# Patient Record
Sex: FEMALE | Race: WHITE | URBAN - METROPOLITAN AREA
[De-identification: names, ages, dates, MRNs, and addresses within clinical notes are randomized per-mention and may not be internally consistent; named-entity substitution may affect disease eponyms.]

---

## 2020-06-08 ENCOUNTER — OFFICE VISIT (OUTPATIENT)
Dept: ENT CLINIC | Age: 25
End: 2020-06-08
Payer: COMMERCIAL

## 2020-06-08 ENCOUNTER — PROCEDURE VISIT (OUTPATIENT)
Dept: AUDIOLOGY | Age: 25
End: 2020-06-08
Payer: COMMERCIAL

## 2020-06-08 VITALS
BODY MASS INDEX: 31.76 KG/M2 | HEART RATE: 75 BPM | SYSTOLIC BLOOD PRESSURE: 114 MMHG | HEIGHT: 64 IN | WEIGHT: 186 LBS | DIASTOLIC BLOOD PRESSURE: 76 MMHG

## 2020-06-08 PROCEDURE — 92557 COMPREHENSIVE HEARING TEST: CPT | Performed by: AUDIOLOGIST

## 2020-06-08 PROCEDURE — 99204 OFFICE O/P NEW MOD 45 MIN: CPT | Performed by: NURSE PRACTITIONER

## 2020-06-08 PROCEDURE — 69210 REMOVE IMPACTED EAR WAX UNI: CPT | Performed by: NURSE PRACTITIONER

## 2020-06-08 PROCEDURE — 92567 TYMPANOMETRY: CPT | Performed by: AUDIOLOGIST

## 2020-06-08 RX ORDER — MONTELUKAST SODIUM 10 MG/1
10 TABLET ORAL NIGHTLY
COMMUNITY

## 2020-06-08 RX ORDER — ALBUTEROL SULFATE 90 UG/1
AEROSOL, METERED RESPIRATORY (INHALATION)
COMMUNITY

## 2020-06-08 RX ORDER — ALBUTEROL SULFATE 2.5 MG/3ML
1 SOLUTION RESPIRATORY (INHALATION)
COMMUNITY
Start: 2019-09-17

## 2020-06-08 ASSESSMENT — ENCOUNTER SYMPTOMS
SHORTNESS OF BREATH: 0
GASTROINTESTINAL NEGATIVE: 1
ABDOMINAL PAIN: 0
EYES NEGATIVE: 1
STRIDOR: 0
RHINORRHEA: 0
RESPIRATORY NEGATIVE: 1

## 2020-06-08 NOTE — PROGRESS NOTES
[id number] [facility] Otolaryngology  Dr. Travis Carlisle. MAUREEN Sarabia Ms.Ed. New Consult       Patient Name:  Prasanna Wiggins  :  1995     CHIEF C/O:    Chief Complaint   Patient presents with    Hearing Problem     right ear hearing loss - almost 4 years       HISTORY OBTAINED FROM:  patient    HISTORY OF PRESENT ILLNESS:       Lisa Epstein is a 22y.o. year old female, here today for loss of hearing in the left ear. She states that the issue has been present for 4 year. She has not previously seen for this. She states she has fullness in the right ear with pain and vibration due to loud noises. She has no history of previous ear surgeries. She denies chronic ear infections recently or as a child. Denies any family history of significant hearing loss. She denies recent drainage or fevers. She does complain of intermittent vertigo. She states that she does get intermittent tinnitus in the left ear that is a single high pitch that is non-pulsatile. She denies significant noise exposure. No past medical history on file. No past surgical history on file. Current Outpatient Medications:     albuterol sulfate  (90 Base) MCG/ACT inhaler, Inhale into the lungs, Disp: , Rfl:     montelukast (SINGULAIR) 10 MG tablet, Take 10 mg by mouth nightly, Disp: , Rfl:     fluticasone-salmeterol (ADVAIR DISKUS) 100-50 MCG/DOSE diskus inhaler, Inhale 1 puff into the lungs every 12 hours, Disp: , Rfl:     albuterol (PROVENTIL) (2.5 MG/3ML) 0.083% nebulizer solution, Inhale 1 mL into the lungs every 4-6 hours as needed, Disp: , Rfl:   Nickel  Social History     Tobacco Use    Smoking status: Not on file   Substance Use Topics    Alcohol use: Not on file    Drug use: Not on file     No family history on file. Review of Systems   Constitutional: Negative. Negative for activity change and appetite change. HENT: Positive for ear pain, hearing loss, postnasal drip and tinnitus.  Negative for congestion, ear discharge and rhinorrhea. Eyes: Negative. Respiratory: Negative. Negative for shortness of breath and stridor. Cardiovascular: Negative. Negative for chest pain and palpitations. Gastrointestinal: Negative. Negative for abdominal pain. Endocrine: Negative. Genitourinary: Negative. Musculoskeletal: Negative. Skin: Negative. Neurological: Positive for dizziness. Hematological: Negative. Psychiatric/Behavioral: Negative. /76 (Site: Right Upper Arm, Position: Sitting, Cuff Size: Medium Adult)   Pulse 75   Ht 5' 4\" (1.626 m)   Wt 186 lb (84.4 kg)   LMP 03/13/2020   BMI 31.93 kg/m²   Physical Exam  Constitutional:       Appearance: Normal appearance. HENT:      Head: Normocephalic. Right Ear: Tympanic membrane, ear canal and external ear normal. There is impacted cerumen. Left Ear: Tympanic membrane, ear canal and external ear normal. There is impacted cerumen. Nose: Nose normal. No congestion or rhinorrhea. Right Nostril: No occlusion. Left Nostril: No occlusion. Right Turbinates: Not enlarged. Left Turbinates: Not enlarged. Mouth/Throat:      Lips: Pink. Mouth: Mucous membranes are moist.      Tongue: No lesions. Palate: No mass. Pharynx: Oropharynx is clear. Eyes:      Conjunctiva/sclera: Conjunctivae normal.      Pupils: Pupils are equal, round, and reactive to light. Neck:      Musculoskeletal: Normal range of motion. No neck rigidity or muscular tenderness. Cardiovascular:      Rate and Rhythm: Normal rate and regular rhythm. Pulses: Normal pulses. Pulmonary:      Effort: Pulmonary effort is normal. No respiratory distress. Breath sounds: Normal breath sounds. No stridor. Abdominal:      Palpations: Abdomen is soft. Tenderness: There is no abdominal tenderness. Musculoskeletal: Normal range of motion. Skin:     General: Skin is warm and dry.    Neurological:      General: No focal deficit present. Mental Status: She is alert and oriented to person, place, and time. Psychiatric:         Mood and Affect: Mood normal.         Behavior: Behavior normal.         Thought Content: Thought content normal.         Judgment: Judgment normal.       Audiogram and tympanogram reviewed with patient. She is found to have mixed hearing loss in the right ear of 20 dB, with 100% discrimination at 60 dB, with 5 dB sensorineural hearing loss in the left ear, 100% discrimination at 45 dB. Tympanogram revealed bilateral type A curves. IMPRESSION/PLAN:  Cerumen removal     Auditory canal(s) both ears completely obstructed with cerumen. Cerumen was gently removed using soft plastic curette. Tympanic membranes are intact following the procedure. Auditory canals appear normal.  Patient tolerated well. Ryley Espinal was seen today for hearing problem. Diagnoses and all orders for this visit:    Mixed conductive and sensorineural hearing loss of left ear with unrestricted hearing of right ear  -     MRI IAC Posterior Fossa W WO Contrast; Future    Bilateral impacted cerumen  -     NE REMOVAL IMPACTED CERUMEN INSTRUMENTATION Cornelius Alvarez is seen today for right sided hearing loss and fullness. On exam she is found to have a complete cerumen impaction of the right ear with partial impaction of the left. These were removed without difficulty. An audiogram and tympanogram were obtained with asymmetric mixed hearing loss of the right ear. An MRI of the IAC is ordered to assess for vestibular schwannoma. She is to return in 1 month for repeat audiogram and to review MRI results. She is instructed to call for any new or worsening of her symptoms.       Ashu Morfin, MSN, FNP-C  8 [facility], Nose and Throat

## 2020-07-01 ENCOUNTER — TELEPHONE (OUTPATIENT)
Dept: ENT CLINIC | Age: 25
End: 2020-07-01

## 2020-07-01 NOTE — TELEPHONE ENCOUNTER
Patient called in wanting to know when her appointment was and if her MRI results were sent over. I gave her our fax number and advised her to call to have them send them again.

## 2020-07-13 ENCOUNTER — OFFICE VISIT (OUTPATIENT)
Dept: ENT CLINIC | Age: 25
End: 2020-07-13
Payer: COMMERCIAL

## 2020-07-13 VITALS — HEIGHT: 64 IN | WEIGHT: 187 LBS | BODY MASS INDEX: 31.92 KG/M2

## 2020-07-13 PROCEDURE — 99213 OFFICE O/P EST LOW 20 MIN: CPT | Performed by: NURSE PRACTITIONER

## 2020-07-13 RX ORDER — ESCITALOPRAM OXALATE 10 MG/1
TABLET ORAL
COMMUNITY
Start: 2020-07-01

## 2020-07-13 RX ORDER — HYDROXYZINE HYDROCHLORIDE 25 MG/1
TABLET, FILM COATED ORAL
COMMUNITY
Start: 2020-07-01

## 2020-07-13 SDOH — HEALTH STABILITY: MENTAL HEALTH: HOW OFTEN DO YOU HAVE A DRINK CONTAINING ALCOHOL?: MONTHLY OR LESS

## 2020-07-13 SDOH — HEALTH STABILITY: MENTAL HEALTH: HOW MANY STANDARD DRINKS CONTAINING ALCOHOL DO YOU HAVE ON A TYPICAL DAY?: 1 OR 2

## 2020-07-13 ASSESSMENT — ENCOUNTER SYMPTOMS
EYES NEGATIVE: 1
STRIDOR: 0
ABDOMINAL PAIN: 0
RHINORRHEA: 0
SHORTNESS OF BREATH: 0
GASTROINTESTINAL NEGATIVE: 1
RESPIRATORY NEGATIVE: 1

## 2020-07-13 NOTE — PROGRESS NOTES
[id number] [facility] Otolaryngology  Dr. Ivory Temple. Rachel Trinh. Ms.Ed        Patient Name:  Lefty Holguin  :  1995     CHIEF C/O:    Chief Complaint   Patient presents with    Results     pt here for MRI results. HISTORY OBTAINED FROM:  patient    HISTORY OF PRESENT ILLNESS:       Dolly Holman is a 22y.o. year old female, here today for follow up of right ear hearing loss and MRI results. She states that she has noticed no changes in her hearing since her last appointment. She denies any pain, drainage or fullness sensation in the right ear. She denies any new congestion, rhinorrhea or post nasal drainage. She denies any new tinnitus or vertigo. She states that the hearing loss in her right ear is beginning to affect her daily function and often has to have people repeat themselves in conversations, especially when other noises are present. No past medical history on file. No past surgical history on file. Current Outpatient Medications:     escitalopram (LEXAPRO) 10 MG tablet, take 1 tablet by mouth once daily, Disp: , Rfl:     hydrOXYzine (ATARAX) 25 MG tablet, take 1 tablet by mouth twice a day, Disp: , Rfl:     albuterol sulfate  (90 Base) MCG/ACT inhaler, Inhale into the lungs, Disp: , Rfl:     montelukast (SINGULAIR) 10 MG tablet, Take 10 mg by mouth nightly, Disp: , Rfl:     fluticasone-salmeterol (ADVAIR DISKUS) 100-50 MCG/DOSE diskus inhaler, Inhale 1 puff into the lungs every 12 hours, Disp: , Rfl:     albuterol (PROVENTIL) (2.5 MG/3ML) 0.083% nebulizer solution, Inhale 1 mL into the lungs every 4-6 hours as needed, Disp: , Rfl:   Nickel  Social History     Tobacco Use    Smoking status: Current Some Day Smoker     Types: Cigarettes    Smokeless tobacco: Never Used   Substance Use Topics    Alcohol use: Yes     Frequency: Monthly or less     Drinks per session: 1 or 2    Drug use: Not Currently     No family history on file. Review of Systems   Constitutional: Negative. Negative for activity change and appetite change. HENT: Positive for hearing loss, postnasal drip and tinnitus. Negative for congestion, ear discharge, ear pain and rhinorrhea. Eyes: Negative. Respiratory: Negative. Negative for shortness of breath and stridor. Cardiovascular: Negative. Negative for chest pain and palpitations. Gastrointestinal: Negative. Negative for abdominal pain. Endocrine: Negative. Genitourinary: Negative. Musculoskeletal: Negative. Skin: Negative. Neurological: Negative for dizziness. Hematological: Negative. Psychiatric/Behavioral: Negative. Ht 5' 4\" (1.626 m)   Wt 187 lb (84.8 kg)   LMP 07/13/2020 (Exact Date)   BMI 32.10 kg/m²   Physical Exam  Constitutional:       Appearance: Normal appearance. HENT:      Head: Normocephalic. Right Ear: Tympanic membrane, ear canal and external ear normal. There is no impacted cerumen. Left Ear: Tympanic membrane, ear canal and external ear normal. There is no impacted cerumen. Nose: Nose normal. No congestion or rhinorrhea. Right Nostril: No occlusion. Left Nostril: No occlusion. Right Turbinates: Not enlarged. Left Turbinates: Not enlarged. Mouth/Throat:      Lips: Pink. Mouth: Mucous membranes are moist.      Tongue: No lesions. Palate: No mass. Pharynx: Oropharynx is clear. Eyes:      Conjunctiva/sclera: Conjunctivae normal.      Pupils: Pupils are equal, round, and reactive to light. Neck:      Musculoskeletal: Normal range of motion. No neck rigidity or muscular tenderness. Cardiovascular:      Rate and Rhythm: Normal rate and regular rhythm. Pulses: Normal pulses. Pulmonary:      Effort: Pulmonary effort is normal. No respiratory distress. Breath sounds: Normal breath sounds. No stridor. Abdominal:      Palpations: Abdomen is soft. Tenderness: There is no abdominal tenderness. Musculoskeletal: Normal range of motion. Skin:     General: Skin is warm and dry. Neurological:      General: No focal deficit present. Mental Status: She is alert and oriented to person, place, and time. Psychiatric:         Mood and Affect: Mood normal.         Behavior: Behavior normal.         Thought Content: Thought content normal.         Judgment: Judgment normal.         IMPRESSION/PLAN:      Mayte Harrison was seen today for results. Diagnoses and all orders for this visit:    Mixed conductive and sensorineural hearing loss of right ear with unrestricted hearing of left ear    Tinnitus, right ear      Mayte Harrison is seen today for follow-up of an MRI and right hearing loss. MRI of the IAC reveals no significant findings. Patient states she has had no change in her hearing since her last appointment. Upon reviewing her previous audiogram, there is a mixed sensorineural and conductive hearing loss to the right ear of 20 dB, greater in the low frequencies. Due to the nature of her hearing loss and chronic severity over the past 4 years she is a good candidate for hearing aid in the right ear and is cleared from an ENT standpoint for this. .  She will be directed towards audiology to start this process. She is to follow-up in 6 months with a repeat audio. She is instructed to call with any new or worsening symptoms prior to her next appointment.       Kelsie Ramirez, MSN, FNP-C  8 [facility], Nose and Throat

## 2024-03-25 ENCOUNTER — APPOINTMENT (OUTPATIENT)
Dept: OBSTETRICS AND GYNECOLOGY | Facility: CLINIC | Age: 29
End: 2024-03-25
Payer: COMMERCIAL

## 2024-04-27 ENCOUNTER — HOSPITAL ENCOUNTER (EMERGENCY)
Facility: HOSPITAL | Age: 29
Discharge: HOME | End: 2024-04-27
Payer: COMMERCIAL

## 2024-04-27 ENCOUNTER — APPOINTMENT (OUTPATIENT)
Dept: RADIOLOGY | Facility: HOSPITAL | Age: 29
End: 2024-04-27
Payer: COMMERCIAL

## 2024-04-27 VITALS
HEART RATE: 67 BPM | SYSTOLIC BLOOD PRESSURE: 130 MMHG | DIASTOLIC BLOOD PRESSURE: 82 MMHG | WEIGHT: 140 LBS | BODY MASS INDEX: 23.9 KG/M2 | HEIGHT: 64 IN | OXYGEN SATURATION: 100 % | TEMPERATURE: 98.1 F | RESPIRATION RATE: 16 BRPM

## 2024-04-27 DIAGNOSIS — S69.91XA INJURY OF RIGHT WRIST, INITIAL ENCOUNTER: Primary | ICD-10-CM

## 2024-04-27 PROCEDURE — 73130 X-RAY EXAM OF HAND: CPT | Mod: RIGHT SIDE | Performed by: RADIOLOGY

## 2024-04-27 PROCEDURE — 73130 X-RAY EXAM OF HAND: CPT | Mod: RT

## 2024-04-27 PROCEDURE — 73110 X-RAY EXAM OF WRIST: CPT | Mod: RIGHT SIDE | Performed by: RADIOLOGY

## 2024-04-27 PROCEDURE — 73110 X-RAY EXAM OF WRIST: CPT | Mod: RT

## 2024-04-27 PROCEDURE — 99284 EMERGENCY DEPT VISIT MOD MDM: CPT | Performed by: NURSE PRACTITIONER

## 2024-04-27 ASSESSMENT — COLUMBIA-SUICIDE SEVERITY RATING SCALE - C-SSRS
6. HAVE YOU EVER DONE ANYTHING, STARTED TO DO ANYTHING, OR PREPARED TO DO ANYTHING TO END YOUR LIFE?: NO
1. IN THE PAST MONTH, HAVE YOU WISHED YOU WERE DEAD OR WISHED YOU COULD GO TO SLEEP AND NOT WAKE UP?: NO
2. HAVE YOU ACTUALLY HAD ANY THOUGHTS OF KILLING YOURSELF?: NO

## 2024-04-27 ASSESSMENT — LIFESTYLE VARIABLES
HAVE YOU EVER FELT YOU SHOULD CUT DOWN ON YOUR DRINKING: NO
EVER HAD A DRINK FIRST THING IN THE MORNING TO STEADY YOUR NERVES TO GET RID OF A HANGOVER: NO
TOTAL SCORE: 0
EVER FELT BAD OR GUILTY ABOUT YOUR DRINKING: NO
HAVE PEOPLE ANNOYED YOU BY CRITICIZING YOUR DRINKING: NO

## 2024-04-27 ASSESSMENT — PAIN DESCRIPTION - ORIENTATION: ORIENTATION: RIGHT

## 2024-04-27 ASSESSMENT — PAIN SCALES - GENERAL
PAINLEVEL_OUTOF10: 9
PAINLEVEL_OUTOF10: 4

## 2024-04-27 ASSESSMENT — PAIN - FUNCTIONAL ASSESSMENT: PAIN_FUNCTIONAL_ASSESSMENT: 0-10

## 2024-04-27 ASSESSMENT — PAIN DESCRIPTION - PROGRESSION: CLINICAL_PROGRESSION: GRADUALLY WORSENING

## 2024-04-27 ASSESSMENT — PAIN DESCRIPTION - PAIN TYPE: TYPE: ACUTE PAIN

## 2024-04-27 ASSESSMENT — PAIN DESCRIPTION - LOCATION: LOCATION: HAND

## 2024-04-27 NOTE — ED PROVIDER NOTES
Chief Complaint   Patient presents with    Hand Injury     Right hand injury, from rollerskating and let it go for about 2 months and has not improved.        HPI       28 year old right hand dominant female presents to the Emergency Department today complaining of right hand and wrist pain status post fall that occurred 1-2 weeks ago. Notes that she slipped while roller skating and fell landing on her right hand. Denies hitting their head, loss of consciousness, or seizure-like activity. Denies any other injuries.       History provided by:  Patient             Patient History   Past Medical History:   Diagnosis Date    38 weeks gestation of pregnancy ([facility]) 03/10/2020    38 weeks gestation of pregnancy    Anxiety disorder, unspecified     Anxiety and depression    Chronic rhinitis     Rhinitis    Encounter for supervision of other normal pregnancy, unspecified trimester ([facility]) 03/10/2020    Normal pregnancy in multigravida     small for gestational age, unspecified weight ([facility]) 2020    SGA (small for gestational age)    Other conditions influencing health status 10/24/2019    History of pregnancy    Other specified pregnancy related conditions, unspecified trimester ([facility]) 2019    Rh negative, antepartum    Personal history of other diseases of the respiratory system     History of bronchitis    Personal history of other diseases of the respiratory system     History of asthma    Personal history of pneumonia (recurrent)     History of pneumonia    Personal history of urinary (tract) infections     History of urinary tract infection     Past Surgical History:   Procedure Laterality Date    OTHER SURGICAL HISTORY  2020    Tonsillectomy    OTHER SURGICAL HISTORY  2020    Bunionectomy    OTHER SURGICAL HISTORY  2020    Elbow surgery     No family history on file.  Social History     Tobacco Use    Smoking status: Not on file    Smokeless tobacco: Not on file    Substance Use Topics    Alcohol use: Not on file    Drug use: Not on file           Physical Exam  Constitutional:       General: She is awake.      Appearance: Normal appearance.   Cardiovascular:      Rate and Rhythm: Normal rate and regular rhythm.      Pulses:           Radial pulses are 3+ on the right side and 3+ on the left side.      Heart sounds: Normal heart sounds. No murmur heard.     No friction rub. No gallop.   Pulmonary:      Effort: Pulmonary effort is normal.      Breath sounds: Normal breath sounds and air entry.   Musculoskeletal:        Arms:       Comments: No obvious deformity or ecchymosis noted to the right hand/wrist, but there is edema and tenderness present over the right first metacarpal and navicular area. No other bony tenderness is appreciated. Right radial pulse is strong and regular. Capillary refill was within normal limits. Sensation is intact distally.    Neurological:      Mental Status: She is alert.   Psychiatric:         Behavior: Behavior is cooperative.         Labs Reviewed - No data to display    XR wrist right 3+ views   Final Result   No abnormalities.             MACRO:   None        Signed by: Heriberto Garnica 4/27/2024 12:14 PM   Dictation workstation:   [id number]      XR hand right 3+ views   Final Result   No abnormalities.             MACRO:   None        Signed by: Heriberto Garnica 4/27/2024 12:15 PM   Dictation workstation:   [id number]               ED Course & MDM   Diagnoses as of 04/27/24 1249   Injury of right wrist, initial encounter           Medical Decision Making  Patient was seen and evaluated by myself. Right hand and wrist x-rays show no acute pathology.  Take Tylenol and Advil over the counter as needed for fever and/or pain. No contraindications to NSAIDs are noted. Due to the navicular tenderness, I will have the staff place her in a Velcro thumb spica splint to wear for comfort. Given Dr. Hernandez, orthopedic doctor on call for follow up.  Return if worse in any way. Discharged in stable condition with computer instructions.     Diagnostic Impression:    1. Acute right wrist pain           Your medication list      You have not been prescribed any medications.           Procedure  Procedures     Marcell Franco, BALTA-RADHA  04/27/24 0986

## 2024-05-10 ENCOUNTER — APPOINTMENT (OUTPATIENT)
Dept: ORTHOPEDIC SURGERY | Facility: CLINIC | Age: 29
End: 2024-05-10
Payer: COMMERCIAL

## 2024-05-28 ENCOUNTER — APPOINTMENT (OUTPATIENT)
Dept: RADIOLOGY | Facility: HOSPITAL | Age: 29
End: 2024-05-28
Payer: COMMERCIAL

## 2024-05-28 ENCOUNTER — HOSPITAL ENCOUNTER (EMERGENCY)
Facility: HOSPITAL | Age: 29
Discharge: HOME | End: 2024-05-28
Payer: COMMERCIAL

## 2024-05-28 ENCOUNTER — PHARMACY VISIT (OUTPATIENT)
Dept: PHARMACY | Facility: CLINIC | Age: 29
End: 2024-05-28
Payer: MEDICAID

## 2024-05-28 VITALS
HEIGHT: 65 IN | WEIGHT: 140 LBS | BODY MASS INDEX: 23.32 KG/M2 | HEART RATE: 75 BPM | RESPIRATION RATE: 15 BRPM | OXYGEN SATURATION: 99 % | TEMPERATURE: 98.5 F | DIASTOLIC BLOOD PRESSURE: 64 MMHG | SYSTOLIC BLOOD PRESSURE: 103 MMHG

## 2024-05-28 DIAGNOSIS — S30.0XXA COCCYX CONTUSION, INITIAL ENCOUNTER: Primary | ICD-10-CM

## 2024-05-28 DIAGNOSIS — S30.0XXA PELVIC CONTUSION, INITIAL ENCOUNTER: ICD-10-CM

## 2024-05-28 DIAGNOSIS — S30.0XXA LUMBAR CONTUSION, INITIAL ENCOUNTER: ICD-10-CM
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
## 2024-05-28 PROCEDURE — 99284 EMERGENCY DEPT VISIT MOD MDM: CPT

## 2024-05-28 PROCEDURE — 72170 X-RAY EXAM OF PELVIS: CPT | Mod: FOREIGN READ | Performed by: RADIOLOGY

## 2024-05-28 PROCEDURE — 72170 X-RAY EXAM OF PELVIS: CPT

## 2024-05-28 PROCEDURE — RXMED WILLOW AMBULATORY MEDICATION CHARGE

## 2024-05-28 PROCEDURE — 2500000001 HC RX 250 WO HCPCS SELF ADMINISTERED DRUGS (ALT 637 FOR MEDICARE OP): Performed by: NURSE PRACTITIONER

## 2024-05-28 PROCEDURE — 72100 X-RAY EXAM L-S SPINE 2/3 VWS: CPT

## 2024-05-28 PROCEDURE — 72100 X-RAY EXAM L-S SPINE 2/3 VWS: CPT | Mod: FOREIGN READ | Performed by: RADIOLOGY

## 2024-05-28 RX ORDER — CYCLOBENZAPRINE HCL 10 MG
10 TABLET ORAL ONCE
Status: COMPLETED | OUTPATIENT
Start: 2024-05-28 | End: 2024-05-28

## 2024-05-28 RX ORDER — CYCLOBENZAPRINE HCL 10 MG
10 TABLET ORAL 3 TIMES DAILY PRN
Qty: 20 TABLET | Refills: 0 | Status: SHIPPED | OUTPATIENT
Start: 2024-05-28 | End: 2024-06-04

## 2024-05-28 RX ORDER — NAPROXEN 500 MG/1
500 TABLET ORAL
Qty: 14 TABLET | Refills: 0 | Status: SHIPPED | OUTPATIENT
Start: 2024-05-28 | End: 2024-06-04

## 2024-05-28 RX ORDER — NAPROXEN 250 MG/1
500 TABLET ORAL ONCE
Status: COMPLETED | OUTPATIENT
Start: 2024-05-28 | End: 2024-05-28

## 2024-05-28 RX ADMIN — NAPROXEN 500 MG: 250 TABLET ORAL at 10:47

## 2024-05-28 RX ADMIN — CYCLOBENZAPRINE HYDROCHLORIDE 10 MG: 10 TABLET, FILM COATED ORAL at 10:47

## 2024-05-28 ASSESSMENT — COLUMBIA-SUICIDE SEVERITY RATING SCALE - C-SSRS
1. IN THE PAST MONTH, HAVE YOU WISHED YOU WERE DEAD OR WISHED YOU COULD GO TO SLEEP AND NOT WAKE UP?: NO
6. HAVE YOU EVER DONE ANYTHING, STARTED TO DO ANYTHING, OR PREPARED TO DO ANYTHING TO END YOUR LIFE?: NO
2. HAVE YOU ACTUALLY HAD ANY THOUGHTS OF KILLING YOURSELF?: NO

## 2024-05-28 ASSESSMENT — LIFESTYLE VARIABLES
HAVE YOU EVER FELT YOU SHOULD CUT DOWN ON YOUR DRINKING: NO
EVER FELT BAD OR GUILTY ABOUT YOUR DRINKING: NO
EVER HAD A DRINK FIRST THING IN THE MORNING TO STEADY YOUR NERVES TO GET RID OF A HANGOVER: NO
TOTAL SCORE: 0
HAVE PEOPLE ANNOYED YOU BY CRITICIZING YOUR DRINKING: NO

## 2024-05-28 ASSESSMENT — PAIN - FUNCTIONAL ASSESSMENT
PAIN_FUNCTIONAL_ASSESSMENT: 0-10
PAIN_FUNCTIONAL_ASSESSMENT: 0-10

## 2024-05-28 ASSESSMENT — PAIN SCALES - GENERAL
PAINLEVEL_OUTOF10: 6
PAINLEVEL_OUTOF10: 8
PAINLEVEL_OUTOF10: 4

## 2024-05-28 ASSESSMENT — VISUAL ACUITY: OU: 1

## 2024-05-28 NOTE — PROGRESS NOTES
Name: Irene Ayoub  : 1995  MRN: [id number]    Date: 2024    Benefits of immediately proceeding with Radiology exam outweigh the risks and therefore the following is being waived:      [x] Pregnancy test - patient had a tubal ligation    [] Protocol for Iodine allergy    [] MRI questionnaire    [] BUN/Creatinine        Roxane Ortega, APRN-CNP

## 2024-05-28 NOTE — ED PROVIDER NOTES
HPI   Chief Complaint   Patient presents with    Tailbone Pain       Patient presents to the emergency department for evaluation of tailbone pain after mechanical fall rollerskating a couple of days ago.  Patient states something became lodged in the wheel of a roller skate causing her to fall abruptly onto her buttocks.  She denies head injury, loss of consciousness, neck pain, upper to mid back pain, chest pain, shortness of breath, nausea, vomiting, abdominal pain, loss of bowel or bladder control, urinary retention, saddle paresthesia, leg weakness or numbness.  There is no open wound or bleeding secondary to her injury.  She has been able to ambulate but this increases her pain along with sitting.  She has taken Tylenol for her discomfort with minimal improvement.  She has no history of fracture, dislocation or surgical intervention to this area in the past.  She presents to the emergency department for evaluation and an x-ray.  Her symptoms are moderate in severity and persistent nature.      History provided by:  Patient   used: No                          [place] Coma Scale Score: 15                  Patient History   Past Medical History:   Diagnosis Date    38 weeks gestation of pregnancy ([facility]) 03/10/2020    38 weeks gestation of pregnancy    Anxiety disorder, unspecified     Anxiety and depression    Chronic rhinitis     Rhinitis    Encounter for supervision of other normal pregnancy, unspecified trimester ([facility]) 03/10/2020    Normal pregnancy in multigravida    [place] small for gestational age, unspecified weight ([facility]) 2020    SGA (small for gestational age)    Other conditions influencing health status 10/24/2019    History of pregnancy    Other specified pregnancy related conditions, unspecified trimester ([facility]) 2019    Rh negative, antepartum    Personal history of other diseases of the respiratory system     History of bronchitis    Personal history  "of other diseases of the respiratory system     History of asthma    Personal history of pneumonia (recurrent)     History of pneumonia    Personal history of urinary (tract) infections     History of urinary tract infection     Past Surgical History:   Procedure Laterality Date    OTHER SURGICAL HISTORY  06/11/2020    Tonsillectomy    OTHER SURGICAL HISTORY  06/11/2020    Bunionectomy    OTHER SURGICAL HISTORY  06/11/2020    Elbow surgery     No family history on file.  Social History     Tobacco Use    Smoking status: Not on file    Smokeless tobacco: Not on file   Substance Use Topics    Alcohol use: Not on file    Drug use: Not on file       Physical Exam   Visit Vitals  /64   Pulse 75   Temp 36.9 °C (98.5 °F)   Resp 15   Ht 1.651 m (5' 5\")   Wt 63.5 kg (140 lb)   SpO2 99%   BMI 23.30 kg/m²   BSA 1.71 m²      Physical Exam  Vitals reviewed.   Constitutional:       General: She is not in acute distress.     Appearance: Normal appearance.   HENT:      Head: Normocephalic and atraumatic.      Right Ear: Hearing, tympanic membrane, ear canal and external ear normal. No hemotympanum.      Left Ear: Hearing, tympanic membrane, ear canal and external ear normal. No hemotympanum.      Nose: Nose normal.      Right Nostril: No epistaxis.      Left Nostril: No epistaxis.      Mouth/Throat:      Lips: Pink.      Mouth: Mucous membranes are moist.      Pharynx: Oropharynx is clear.   Eyes:      General: Lids are normal. Vision grossly intact.      Pupils: Pupils are equal, round, and reactive to light.   Neck:      Trachea: Trachea normal.   Cardiovascular:      Rate and Rhythm: Normal rate and regular rhythm.      Pulses:           Dorsalis pedis pulses are 2+ on the right side and 2+ on the left side.        Posterior tibial pulses are 2+ on the right side and 2+ on the left side.      Comments: No calf tenderness, palpable cords or localized foot/ankle edema bilaterally.   Pulmonary:      Effort: Pulmonary effort " is normal.      Breath sounds: Normal breath sounds.   Chest:      Chest wall: No tenderness or crepitus.   Abdominal:      General: Bowel sounds are normal.      Palpations: Abdomen is soft.      Tenderness: There is no abdominal tenderness.   Musculoskeletal:      Cervical back: Full passive range of motion without pain and neck supple. No spinous process tenderness.      Right lower leg: No edema.      Left lower leg: No edema.      Comments: WHITE randomly.  There is no midline vertebral tenderness to the cervical or thoracic spine.  There is tenderness to the lower lumbar and throughout the sacrum as well as to palpation of the right iliac crest.  There is no obvious crepitus or deformity.  No pain upon palpation of the bilateral lateral hips, femurs or distally through the lower extremities bilaterally.  Neurovascularly intact with good range of motion of the bilateral knees, ankles and toes.  Compartments are soft.  Patient is able to ambulate and bear weight which exacerbates her pain at her tailbone.   Skin:     General: Skin is warm and dry.      Capillary Refill: Capillary refill takes less than 2 seconds.      Findings: No ecchymosis or wound.      Comments: Patient changed into a gown and there is no evidence of ecchymosis or open wound and no pilonidal cyst.   Neurological:      General: No focal deficit present.      Mental Status: She is alert and oriented to person, place, and time.      Sensory: Sensation is intact.      Motor: Motor function is intact.      Coordination: Coordination is intact.      Comments: Clear speech.  No facial asymmetry.  Hand grasps, pushes, pulls, dorsiflexion and plantarflexion strong and equal bilaterally.   Psychiatric:         Behavior: Behavior is cooperative.         XR pelvis 1-2 views   Final Result   Lumbar spine: No acute fracture or malalignment.   Pelvis: No acute fracture or malalignment.   Signed by Naseem Orellana,       XR lumbar spine 2-3 views   Final  Result   Lumbar spine: No acute fracture or malalignment.   Pelvis: No acute fracture or malalignment.   Signed by Naseem Orellana, DO          Labs Reviewed - No data to display      ED Course & MDM   ED Course as of 05/28/24 1242   e May 28, 2024   1155 Discussed results with patient including a printout of her x-ray results and a picture of her lumbar spine lateral view showing the view of her sacrum and coccyx.  She is aware that the coccyx is a partial view and not a dedicated x-ray of it.  She is aware even if there is a small fracture it would not change treatment plan and she knew this prior to and appreciates sparing radiation.  She is amenable to outpatient treatment and management with NSAID, muscle relaxant which she is aware the sedative effects, ice, extra padding while sitting and monitoring for signs and symptoms indicative of reevaluation in the emergency department otherwise outpatient follow-up in 1 week.  [NA]      ED Course User Index  [NA] Roxane Ortega, APRN-CNP         Diagnoses as of 05/28/24 1242   Coccyx contusion, initial encounter   Fall, initial encounter   Lumbar contusion, initial encounter   Pelvic contusion, initial encounter           Medical Decision Making  Patient presents to the ED for evaluation of tailbone pain after a mechanical fall onto her buttocks while rollerskating. Differential diagnosis of fracture and contusion as well as  pilonidal hematoma. Plan is for x-ray, ice pack and NSAID with muscle relaxant which patient was offered IM injection and prefers p.o.  Patient provides consent for this management plan and denies concern for pregnancy as she has a tubal ligation and prefers to waive for pregnancy test.     Imaging as interpreted by radiologist negative for acute findings as documented above. Plan is subsequently for symptom control with naprosyn 500 mg BID with meals, flexeril PRN which they are aware of the sedative effects, ice, ROM exercises and with  appropriate outpatient follow-up with PCP as provided on their discharge handout. Patient is educated on S/S to watch for indicative of re-evaluation in the ER setting including worsening of current symptoms not responding to the treatment plan. Patient verbalized understanding of instructions and is amenable to this treatment plan. Patient departed in stable condition with no social determinants of health that would obscure this outpatient management plan.           Your medication list        START taking these medications        Instructions Last Dose Given Next Dose Due   cyclobenzaprine 10 mg tablet  Commonly known as: Flexeril      Take 1 tablet (10 mg) by mouth 3 times a day as needed for muscle spasms for up to 7 days.       naproxen 500 mg tablet  Commonly known as: Naprosyn      Take 1 tablet (500 mg) by mouth 2 times daily (morning and late afternoon) for 7 days.                 Where to Get Your Medications        These medications were sent to [facility]  [address] [place] [id number]      Hours: 8AM to 6PM Mon-Fri, 8AM to 4PM Sat-Sun Phone: [phone]   cyclobenzaprine 10 mg tablet  naproxen 500 mg tablet         Procedure  None     *This report was transcribed using voice recognition software.  Every effort was made to ensure accuracy; however, inadvertent computerized transcription errors may be present.*  BALTA Trejo-RADHA  05/28/24         BALTA Trejo-RADHA  05/28/24 1240

## 2024-12-21 ENCOUNTER — APPOINTMENT (OUTPATIENT)
Dept: RADIOLOGY | Facility: HOSPITAL | Age: 29
End: 2024-12-21
Payer: COMMERCIAL

## 2024-12-21 ENCOUNTER — HOSPITAL ENCOUNTER (EMERGENCY)
Facility: HOSPITAL | Age: 29
Discharge: HOME | End: 2024-12-21
Attending: EMERGENCY MEDICINE
Payer: COMMERCIAL

## 2024-12-21 VITALS
HEIGHT: 65 IN | DIASTOLIC BLOOD PRESSURE: 68 MMHG | RESPIRATION RATE: 20 BRPM | HEART RATE: 89 BPM | TEMPERATURE: 98.2 F | BODY MASS INDEX: 24.16 KG/M2 | OXYGEN SATURATION: 100 % | WEIGHT: 145 LBS | SYSTOLIC BLOOD PRESSURE: 117 MMHG

## 2024-12-21 DIAGNOSIS — J98.01 COUGH DUE TO BRONCHOSPASM: ICD-10-CM

## 2024-12-21 DIAGNOSIS — J45.41 MODERATE PERSISTENT ASTHMA WITH EXACERBATION (HHS-HCC): Primary | ICD-10-CM

## 2024-12-21 PROBLEM — G56.00 CARPAL TUNNEL SYNDROME: Status: ACTIVE | Noted: 2024-12-21

## 2024-12-21 PROBLEM — J45.40 MODERATE PERSISTENT ASTHMA: Status: ACTIVE | Noted: 2019-09-17

## 2024-12-21 PROCEDURE — 99283 EMERGENCY DEPT VISIT LOW MDM: CPT | Mod: 25 | Performed by: EMERGENCY MEDICINE

## 2024-12-21 PROCEDURE — 71046 X-RAY EXAM CHEST 2 VIEWS: CPT | Performed by: STUDENT IN AN ORGANIZED HEALTH CARE EDUCATION/TRAINING PROGRAM

## 2024-12-21 PROCEDURE — 2500000002 HC RX 250 W HCPCS SELF ADMINISTERED DRUGS (ALT 637 FOR MEDICARE OP, ALT 636 FOR OP/ED): Performed by: EMERGENCY MEDICINE

## 2024-12-21 PROCEDURE — 2500000001 HC RX 250 WO HCPCS SELF ADMINISTERED DRUGS (ALT 637 FOR MEDICARE OP): Performed by: EMERGENCY MEDICINE

## 2024-12-21 PROCEDURE — 2500000004 HC RX 250 GENERAL PHARMACY W/ HCPCS (ALT 636 FOR OP/ED): Performed by: EMERGENCY MEDICINE

## 2024-12-21 PROCEDURE — 94640 AIRWAY INHALATION TREATMENT: CPT

## 2024-12-21 PROCEDURE — 71046 X-RAY EXAM CHEST 2 VIEWS: CPT

## 2024-12-21 RX ORDER — ALBUTEROL SULFATE 0.83 MG/ML
2.5 SOLUTION RESPIRATORY (INHALATION) EVERY 4 HOURS PRN
Qty: 75 ML | Refills: 11 | Status: SHIPPED | OUTPATIENT
Start: 2024-12-21

## 2024-12-21 RX ORDER — BENZONATATE 100 MG/1
200 CAPSULE ORAL ONCE
Status: COMPLETED | OUTPATIENT
Start: 2024-12-21 | End: 2024-12-21

## 2024-12-21 RX ORDER — IPRATROPIUM BROMIDE AND ALBUTEROL SULFATE 2.5; .5 MG/3ML; MG/3ML
3 SOLUTION RESPIRATORY (INHALATION)
Status: DISCONTINUED | OUTPATIENT
Start: 2024-12-21 | End: 2024-12-21 | Stop reason: HOSPADM

## 2024-12-21 RX ORDER — BENZONATATE 100 MG/1
100 CAPSULE ORAL EVERY 8 HOURS
Qty: 21 CAPSULE | Refills: 0 | Status: SHIPPED | OUTPATIENT
Start: 2024-12-21 | End: 2024-12-28

## 2024-12-21 RX ORDER — PREDNISONE 10 MG/1
60 TABLET ORAL ONCE
Status: COMPLETED | OUTPATIENT
Start: 2024-12-21 | End: 2024-12-21

## 2024-12-21 RX ORDER — ALBUTEROL SULFATE 90 UG/1
2 INHALANT RESPIRATORY (INHALATION) EVERY 4 HOURS PRN
Qty: 18 G | Refills: 11 | Status: SHIPPED | OUTPATIENT
Start: 2024-12-21

## 2024-12-21 RX ORDER — ALBUTEROL SULFATE 90 UG/1
2 INHALANT RESPIRATORY (INHALATION) EVERY 4 HOURS PRN
COMMUNITY
End: 2024-12-21

## 2024-12-21 RX ORDER — ALBUTEROL SULFATE 0.83 MG/ML
2.5 SOLUTION RESPIRATORY (INHALATION) EVERY 4 HOURS PRN
COMMUNITY
Start: 2019-09-17 | End: 2024-12-21

## 2024-12-21 RX ORDER — PREDNISONE 20 MG/1
60 TABLET ORAL DAILY
Qty: 12 TABLET | Refills: 0 | Status: SHIPPED | OUTPATIENT
Start: 2024-12-21 | End: 2024-12-25

## 2024-12-21 RX ADMIN — PREDNISONE 60 MG: 10 TABLET ORAL at 11:43

## 2024-12-21 RX ADMIN — IPRATROPIUM BROMIDE AND ALBUTEROL SULFATE 3 ML: 2.5; .5 SOLUTION RESPIRATORY (INHALATION) at 12:01

## 2024-12-21 RX ADMIN — BENZONATATE 200 MG: 100 CAPSULE ORAL at 12:44

## 2024-12-21 ASSESSMENT — COLUMBIA-SUICIDE SEVERITY RATING SCALE - C-SSRS
2. HAVE YOU ACTUALLY HAD ANY THOUGHTS OF KILLING YOURSELF?: NO
6. HAVE YOU EVER DONE ANYTHING, STARTED TO DO ANYTHING, OR PREPARED TO DO ANYTHING TO END YOUR LIFE?: NO
1. IN THE PAST MONTH, HAVE YOU WISHED YOU WERE DEAD OR WISHED YOU COULD GO TO SLEEP AND NOT WAKE UP?: NO

## 2024-12-21 ASSESSMENT — PAIN SCALES - GENERAL: PAINLEVEL_OUTOF10: 0 - NO PAIN

## 2024-12-21 ASSESSMENT — PAIN - FUNCTIONAL ASSESSMENT: PAIN_FUNCTIONAL_ASSESSMENT: 0-10

## 2024-12-21 NOTE — ED PROVIDER NOTES
HPI   Chief Complaint   Patient presents with   • Cough     Hx of asthma        Patient presents emergency department for 3 weeks of cough.  Patient states she has a history of asthma.  Was last on steroids back in October.  Patient has been using her inhaler as well as her nebulizer at home without any improvement.  States she needs refills of both.  Feels like she needs steroids.  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  Patient has any associated fevers.  Patient does not have concern for COVID or flu and does not want testing.  Patient denies any fever.  She comes to the Emergency Department today because she is now developing chest pain with her coughing.              Patient History   Past Medical History:   Diagnosis Date   • 38 weeks gestation of pregnancy ([facility]) 03/10/2020    38 weeks gestation of pregnancy   • Anxiety disorder, unspecified     Anxiety and depression   • Chronic rhinitis     Rhinitis   • Encounter for supervision of other normal pregnancy, unspecified trimester ([facility]) 03/10/2020    Normal pregnancy in multigravida   •  small for gestational age, unspecified weight ([facility]) 2020    SGA (small for gestational age)   • Other conditions influencing health status 10/24/2019    History of pregnancy   • Other specified pregnancy related conditions, unspecified trimester ([facility]) 2019    Rh negative, antepartum   • Personal history of other diseases of the respiratory system     History of bronchitis   • Personal history of other diseases of the respiratory system     History of asthma   • Personal history of pneumonia (recurrent)     History of pneumonia   • Personal history of urinary (tract) infections     History of urinary tract infection     Past Surgical History:   Procedure Laterality Date   • OTHER SURGICAL HISTORY  2020    Tonsillectomy   • OTHER SURGICAL HISTORY  2020    Bunionectomy   • OTHER SURGICAL  HISTORY  06/11/2020    Elbow surgery     No family history on file.  Social History     Tobacco Use   • Smoking status: Not on file   • Smokeless tobacco: Not on file   Substance Use Topics   • Alcohol use: Not on file   • Drug use: Not on file       Physical Exam   ED Triage Vitals [12/21/24 1105]   Temperature Heart Rate Respirations BP   36.8 °C (98.2 °F) (!) 109 16 128/90      Pulse Ox Temp src Heart Rate Source Patient Position   99 % -- -- --      BP Location FiO2 (%)     -- --       Physical Exam  Vitals and nursing note reviewed.   Constitutional:       General: She is not in acute distress.     Appearance: She is well-developed.   HENT:      Head: Normocephalic and atraumatic.      Right Ear: External ear normal.      Left Ear: External ear normal.      Nose: Nose normal.      Mouth/Throat:      Mouth: Mucous membranes are moist.      Pharynx: Oropharynx is clear.   Eyes:      Extraocular Movements: Extraocular movements intact.      Conjunctiva/sclera: Conjunctivae normal.   Neck:      Comments: Trachea midline  Cardiovascular:      Rate and Rhythm: Normal rate.      Pulses: Normal pulses.   Pulmonary:      Effort: Pulmonary effort is normal. No respiratory distress.      Breath sounds: Wheezing and rhonchi present.   Abdominal:      General: Bowel sounds are normal.      Palpations: Abdomen is soft.      Tenderness: There is no abdominal tenderness.   Musculoskeletal:         General: No swelling or tenderness.      Cervical back: No tenderness.   Skin:     General: Skin is warm and dry.      Capillary Refill: Capillary refill takes less than 2 seconds.   Neurological:      General: No focal deficit present.      Mental Status: She is alert and oriented to person, place, and time.   Psychiatric:         Mood and Affect: Mood normal.         Thought Content: Thought content does not include homicidal or suicidal ideation.           ED Course & MDM   ED Course as of 12/29/24 1835   Sat Dec 21, 2024   7596  Patient presents with cough and wheezing for weeks. Available chart reviewed. On initial assessment the patient was found non-toxic, no acute distress, vitals hemodynamically stable and afebrile. Initial concern for pneumonia, viral infection, asthma exacerbation.  Will give DuoNeb and prednisone here in the emergency department and obtain chest x-ray. [JH]   1319 Chest x-ray is read as negative.  Images personally reviewed and I agree, no large consolidation, pneumonia or pneumothorax.  Will discharge home with prescriptions for prednisone, Tessalon Perles, albuterol inhaler and albuterol nebulizers as well as Qvar.    No indication for admission.  Discussed findings and diagnosis with the patient, follow-up and return to ED precautions given, patient voiced understanding, agrees with plan, questions answered, patient was discharged in stable condition. [JH]      ED Course User Index  [JH] Kendall Raza MD         Diagnoses as of 12/29/24 8660   Moderate persistent asthma with exacerbation ([facility])   Cough due to bronchospasm                 No data recorded     [place] Coma Scale Score: 15 (12/21/24 1109 : Jessica Garcia RN)                           Medical Decision Making      Procedure  Procedures     Kendall Raza MD  12/29/24 9528

## 2024-12-23 ENCOUNTER — APPOINTMENT (OUTPATIENT)
Dept: URGENT CARE | Age: 29
End: 2024-12-23
Payer: COMMERCIAL